# Patient Record
Sex: MALE | Race: WHITE | NOT HISPANIC OR LATINO | Employment: OTHER | URBAN - METROPOLITAN AREA
[De-identification: names, ages, dates, MRNs, and addresses within clinical notes are randomized per-mention and may not be internally consistent; named-entity substitution may affect disease eponyms.]

---

## 2024-01-18 ENCOUNTER — OFFICE VISIT (OUTPATIENT)
Dept: SURGERY | Facility: CLINIC | Age: 49
End: 2024-01-18

## 2024-01-18 VITALS — WEIGHT: 236.2 LBS | HEIGHT: 70 IN | BODY MASS INDEX: 33.82 KG/M2

## 2024-01-18 DIAGNOSIS — D49.2 SOFT TISSUE NEOPLASM: Primary | ICD-10-CM

## 2024-01-18 RX ORDER — THYROID 30 MG/1
30 TABLET ORAL DAILY
COMMUNITY
Start: 2022-01-03

## 2024-01-18 RX ORDER — LISINOPRIL 10 MG/1
10 TABLET ORAL DAILY
COMMUNITY
Start: 2024-01-02

## 2024-01-18 NOTE — PROGRESS NOTES
Ganesh De Luna is a 48 y.o. male who presents to the office for a soft tissue neoplasm of the right buttock.    History of Present Illness     The patient has a history of weight lifting during which time he took anabolic steroids for performance reasons.  They did provide a significant performance enhancement and gain in muscle development but he developed low testosterone as a consequence.  He noticed a significant decrease in his energy level as well as his motivation and focus.  He began testosterone supplements and felt much better.  Due to his need for ongoing testosterone supplementation, he opted for placement of testosterone pellets.  He has had this done on several occasions with no problems.  He had pellets placed relatively recently in his right buttock and this created a local reaction that has led to a soft tissue mass in that area.  It is mobile but somewhat painful.  He was reportedly evaluated with an ultrasound that showed the pellets in the midst of the soft tissue reaction.  There has been no other trauma to the area.    Review of Systems   Constitutional:  Negative for fatigue and fever.   Respiratory:  Negative for chest tightness and shortness of breath.    Cardiovascular:  Negative for chest pain and palpitations.   Gastrointestinal:  Negative for abdominal pain, blood in stool, constipation, diarrhea, nausea and vomiting.     Past Medical History:   Diagnosis Date    Hypertension 01-2-2014    Thyroid disorder      History reviewed. No pertinent surgical history.  Family History   Problem Relation Age of Onset    Hypertension Father      Social History     Socioeconomic History    Marital status: Single   Tobacco Use    Smoking status: Never    Smokeless tobacco: Never   Vaping Use    Vaping Use: Never used   Substance and Sexual Activity    Alcohol use: Not Currently    Drug use: Never    Sexual activity: Yes     Partners: Female       Objective   Physical Exam  Constitutional:        Appearance: Normal appearance. He is well-developed. He is not toxic-appearing.   Eyes:      General: No scleral icterus.  Pulmonary:      Effort: Pulmonary effort is normal. No respiratory distress.   Skin:     General: Skin is warm and dry.      Comments: There is a 4 cm soft tissue neoplasm of the subcutaneous tissue of the right buttock that is mobile with well-defined margins.  There is no overlying erythema.  There is no palpable fluctuance nor induration.   Neurological:      Mental Status: He is alert and oriented to person, place, and time.   Psychiatric:         Behavior: Behavior normal.         Thought Content: Thought content normal.         Judgment: Judgment normal.       BMI is >= 30 and <35. (Class 1 Obesity). The following options were offered after discussion;: weight loss educational material (shared in after visit summary)      Assessment & Plan     1.  Soft tissue neoplasm: The patient has a soft tissue neoplasm of the right buttock that appears to be consistent with a reaction to placement of testosterone pellets.  This was discussed with him.  He will be scheduled for an excision of the soft tissue neoplasm as an office procedure.  The patient understands the indications, alternatives, risks, and benefits of the procedure and wishes to proceed.

## 2024-01-23 ENCOUNTER — PROCEDURE VISIT (OUTPATIENT)
Dept: SURGERY | Facility: CLINIC | Age: 49
End: 2024-01-23

## 2024-01-23 VITALS — WEIGHT: 236 LBS | BODY MASS INDEX: 33.79 KG/M2 | HEIGHT: 70 IN

## 2024-01-23 DIAGNOSIS — D49.2 SOFT TISSUE NEOPLASM: Primary | ICD-10-CM

## 2024-01-23 PROCEDURE — 88304 TISSUE EXAM BY PATHOLOGIST: CPT | Performed by: SURGERY

## 2024-01-23 NOTE — PROGRESS NOTES
Procedure note    Indications    The patient is a pleasant 48-year-old male who has low testosterone related to anabolic steroids taken as a young adult.  He is using testosterone pellets which were placed in the subcutaneous tissue.  He has developed an inflammatory mass in the right buttock related to those testosterone pellets.  He presents for excision of the soft tissue neoplasm.  The patient understands the indications, alternatives, risks, and benefits of the procedure and wishes to proceed.     Procedure    The patient was identified and taken to the procedure room where he was placed in the left side down lateral decubitus position.  The area of his right lateral buttock was prepped and draped in the standard surgical fashion.  It was infiltrated with 1% lidocaine with epinephrine.  This infiltration was continued through the procedure as the excision was performed.  An incision was made overlying the lesion with a scalpel carried through the skin into the subcutaneous tissue.  The subcutaneous tissue was divided using the Hyfrecator down to the lesion.  The lesion had significant inflammatory adhesions to all the surrounding subcutaneous tissue.  These adhesions were carefully divided using the Hyfrecator and scissors.  A 3-0 Vicryl suture was placed through the lesion to allow improved elevation.  It was carefully dissected free from all subcutaneous attachments and passed off for pathology.  The lesion was about 4 cm in size.  Hemostasis was noted to be adequate.  The subcutaneous tissue was reapproximated with 4-0 Vicryl suture in a running fashion.  The skin was closed with a 4-0 Monocryl in a subcuticular fashion followed by Mastisol and Steri-Strips.  The patient tolerated the procedure well.

## 2024-01-24 ENCOUNTER — TELEPHONE (OUTPATIENT)
Dept: SURGERY | Facility: CLINIC | Age: 49
End: 2024-01-24

## 2024-01-24 NOTE — TELEPHONE ENCOUNTER
Anmol called this morning after having inflammatory mass excised in in office yesterday 01/24/24. Patient stated that the area has had significant swelling, and is about the size of a grapefruit. Patient is not having any tenderness or pain in the area and no drainage. Patient has been icing the area for the last hour (15 minutes on/ 15 minutes off). I advised patient to continue icing the area, and would send a message to Dr. Guillen to see how he would like to proceed.

## 2024-01-24 NOTE — TELEPHONE ENCOUNTER
Called and relayed to patient that the swelling is to be expected and per Dr. Guillen he should continue to ice the area. I informed him that Dr. Guillen is willing to see him in office tomorrow 01/25/24 if he would like to come in . Patient declined the office visit at this time, but he will continue to ice the area for now and call back if he has any other concerns.

## 2024-01-25 LAB
LAB AP CASE REPORT: NORMAL
LAB AP DIAGNOSIS COMMENT: NORMAL
PATH REPORT.FINAL DX SPEC: NORMAL
PATH REPORT.GROSS SPEC: NORMAL

## 2024-02-06 ENCOUNTER — OFFICE VISIT (OUTPATIENT)
Dept: SURGERY | Facility: CLINIC | Age: 49
End: 2024-02-06

## 2024-02-06 VITALS
SYSTOLIC BLOOD PRESSURE: 118 MMHG | HEIGHT: 70 IN | WEIGHT: 230 LBS | BODY MASS INDEX: 32.93 KG/M2 | DIASTOLIC BLOOD PRESSURE: 78 MMHG

## 2024-02-06 DIAGNOSIS — Z48.89 POSTOPERATIVE VISIT: Primary | ICD-10-CM

## 2024-02-06 PROCEDURE — 99024 POSTOP FOLLOW-UP VISIT: CPT | Performed by: SURGERY

## 2024-02-06 NOTE — PROGRESS NOTES
Ganesh De Luan is a 48 y.o. male who returns to the office after undergoing an excision of a soft tissue neoplasm of the right buttock on 1/23/2024.     History of Present Illness     The patient is recovering well with no significant postop symptoms.  He developed a seroma immediately after the procedure which is still present but slowly getting smaller.  He has no symptoms of pain at this point.  He has returned to normal activity including his workout.    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  At the time of excision, the entire process was removed and there were no visible pellets in the surrounding tissue.    Review of Systems   Constitutional:  Negative for activity change, appetite change, fatigue and fever.   Respiratory:  Negative for chest tightness and shortness of breath.    Cardiovascular:  Negative for chest pain and palpitations.   Gastrointestinal:  Negative for abdominal pain, constipation, diarrhea and nausea.   Skin:  Negative for rash and wound.   Psychiatric/Behavioral:  Negative for agitation and confusion.        Objective   Physical Exam  Constitutional:       General: He is not in acute distress.     Appearance: Normal appearance. He is not ill-appearing or toxic-appearing.   Pulmonary:      Effort: Pulmonary effort is normal. No respiratory distress.   Skin:     Comments: Incision: intact with no evidence of infection.  There is a seroma present.   Neurological:      Mental Status: He is alert.   Psychiatric:         Behavior: Behavior normal.                 Assessment & Plan     1.  Postoperative visit: The patient is recovering well from the excision of a soft tissue neoplasm.  He has an uncomplicated seroma that should resolve spontaneously.  This was discussed with him.  At this point he has no limitations.  He will follow-up on an as-needed basis.

## 2024-02-15 ENCOUNTER — TELEPHONE (OUTPATIENT)
Dept: SURGERY | Facility: CLINIC | Age: 49
End: 2024-02-15